# Patient Record
Sex: FEMALE | Race: WHITE | Employment: FULL TIME | URBAN - METROPOLITAN AREA
[De-identification: names, ages, dates, MRNs, and addresses within clinical notes are randomized per-mention and may not be internally consistent; named-entity substitution may affect disease eponyms.]

---

## 2022-02-26 ENCOUNTER — HOSPITAL ENCOUNTER (OUTPATIENT)
Age: 52
Setting detail: OBSERVATION
Discharge: HOME | End: 2022-02-27
Attending: EMERGENCY MEDICINE | Admitting: INTERNAL MEDICINE
Payer: COMMERCIAL

## 2022-02-26 ENCOUNTER — APPOINTMENT (OUTPATIENT)
Dept: CT IMAGING | Age: 52
End: 2022-02-26
Attending: EMERGENCY MEDICINE
Payer: COMMERCIAL

## 2022-02-26 DIAGNOSIS — G44.53 THUNDERCLAP HEADACHE: Primary | ICD-10-CM

## 2022-02-26 DIAGNOSIS — R20.0 RIGHT FACIAL NUMBNESS: ICD-10-CM

## 2022-02-26 DIAGNOSIS — R73.9 HYPERGLYCEMIA: ICD-10-CM

## 2022-02-26 DIAGNOSIS — G43.109 COMPLICATED MIGRAINE: ICD-10-CM

## 2022-02-26 PROBLEM — I63.9 ACUTE CVA (CEREBROVASCULAR ACCIDENT) (HCC): Status: ACTIVE | Noted: 2022-02-26

## 2022-02-26 LAB
ALBUMIN SERPL-MCNC: 4.8 G/DL (ref 3.5–5)
ALBUMIN/GLOB SERPL: 1.5 {RATIO} (ref 1.1–2.2)
ALP SERPL-CCNC: 48 U/L (ref 45–117)
ALT SERPL-CCNC: 53 U/L (ref 12–78)
ANION GAP SERPL CALC-SCNC: 20 MMOL/L (ref 5–15)
AST SERPL-CCNC: 21 U/L (ref 15–37)
ATRIAL RATE: 66 BPM
BASOPHILS # BLD: 0.1 K/UL (ref 0–0.1)
BASOPHILS NFR BLD: 1 % (ref 0–1)
BILIRUB SERPL-MCNC: 0.8 MG/DL (ref 0.2–1)
BUN SERPL-MCNC: 14 MG/DL (ref 6–20)
BUN/CREAT SERPL: 14 (ref 12–20)
CALCIUM SERPL-MCNC: 10 MG/DL (ref 8.5–10.1)
CALCULATED P AXIS, ECG09: 59 DEGREES
CALCULATED R AXIS, ECG10: 126 DEGREES
CALCULATED T AXIS, ECG11: 74 DEGREES
CHLORIDE SERPL-SCNC: 95 MMOL/L (ref 97–108)
CO2 SERPL-SCNC: 21 MMOL/L (ref 21–32)
CREAT SERPL-MCNC: 1 MG/DL (ref 0.55–1.02)
DIAGNOSIS, 93000: NORMAL
DIFFERENTIAL METHOD BLD: NORMAL
EOSINOPHIL # BLD: 0.1 K/UL (ref 0–0.4)
EOSINOPHIL NFR BLD: 3 % (ref 0–7)
ERYTHROCYTE [DISTWIDTH] IN BLOOD BY AUTOMATED COUNT: 12.5 % (ref 11.5–14.5)
GLOBULIN SER CALC-MCNC: 3.2 G/DL (ref 2–4)
GLUCOSE BLD STRIP.AUTO-MCNC: 193 MG/DL (ref 65–117)
GLUCOSE BLD STRIP.AUTO-MCNC: 310 MG/DL (ref 65–117)
GLUCOSE SERPL-MCNC: 306 MG/DL (ref 65–100)
HCT VFR BLD AUTO: 44.3 % (ref 35–47)
HGB BLD-MCNC: 14.8 G/DL (ref 11.5–16)
IMM GRANULOCYTES # BLD AUTO: 0 K/UL (ref 0–0.04)
IMM GRANULOCYTES NFR BLD AUTO: 0 % (ref 0–0.5)
INR PPP: 1.1 (ref 0.9–1.1)
LYMPHOCYTES # BLD: 2.2 K/UL (ref 0.8–3.5)
LYMPHOCYTES NFR BLD: 39 % (ref 12–49)
MCH RBC QN AUTO: 30.6 PG (ref 26–34)
MCHC RBC AUTO-ENTMCNC: 33.4 G/DL (ref 30–36.5)
MCV RBC AUTO: 91.7 FL (ref 80–99)
MONOCYTES # BLD: 0.3 K/UL (ref 0–1)
MONOCYTES NFR BLD: 6 % (ref 5–13)
NEUTS SEG # BLD: 2.8 K/UL (ref 1.8–8)
NEUTS SEG NFR BLD: 51 % (ref 32–75)
NRBC # BLD: 0 K/UL (ref 0–0.01)
NRBC BLD-RTO: 0 PER 100 WBC
P-R INTERVAL, ECG05: 154 MS
PLATELET # BLD AUTO: 322 K/UL (ref 150–400)
PMV BLD AUTO: 10.5 FL (ref 8.9–12.9)
POTASSIUM SERPL-SCNC: 3.7 MMOL/L (ref 3.5–5.1)
PROT SERPL-MCNC: 8 G/DL (ref 6.4–8.2)
PROTHROMBIN TIME: 10.9 SEC (ref 9–11.1)
Q-T INTERVAL, ECG07: 432 MS
QRS DURATION, ECG06: 90 MS
QTC CALCULATION (BEZET), ECG08: 452 MS
RBC # BLD AUTO: 4.83 M/UL (ref 3.8–5.2)
SERVICE CMNT-IMP: ABNORMAL
SERVICE CMNT-IMP: ABNORMAL
SODIUM SERPL-SCNC: 136 MMOL/L (ref 136–145)
VENTRICULAR RATE, ECG03: 66 BPM
WBC # BLD AUTO: 5.5 K/UL (ref 3.6–11)

## 2022-02-26 PROCEDURE — 96372 THER/PROPH/DIAG INJ SC/IM: CPT

## 2022-02-26 PROCEDURE — G0378 HOSPITAL OBSERVATION PER HR: HCPCS

## 2022-02-26 PROCEDURE — 65660000000 HC RM CCU STEPDOWN

## 2022-02-26 PROCEDURE — 70450 CT HEAD/BRAIN W/O DYE: CPT

## 2022-02-26 PROCEDURE — 36415 COLL VENOUS BLD VENIPUNCTURE: CPT

## 2022-02-26 PROCEDURE — 96374 THER/PROPH/DIAG INJ IV PUSH: CPT

## 2022-02-26 PROCEDURE — 99285 EMERGENCY DEPT VISIT HI MDM: CPT

## 2022-02-26 PROCEDURE — 82962 GLUCOSE BLOOD TEST: CPT

## 2022-02-26 PROCEDURE — 96361 HYDRATE IV INFUSION ADD-ON: CPT

## 2022-02-26 PROCEDURE — 96375 TX/PRO/DX INJ NEW DRUG ADDON: CPT

## 2022-02-26 PROCEDURE — 96376 TX/PRO/DX INJ SAME DRUG ADON: CPT

## 2022-02-26 PROCEDURE — 80053 COMPREHEN METABOLIC PANEL: CPT

## 2022-02-26 PROCEDURE — 74011000636 HC RX REV CODE- 636: Performed by: EMERGENCY MEDICINE

## 2022-02-26 PROCEDURE — 85610 PROTHROMBIN TIME: CPT

## 2022-02-26 PROCEDURE — 74011000250 HC RX REV CODE- 250: Performed by: EMERGENCY MEDICINE

## 2022-02-26 PROCEDURE — 70496 CT ANGIOGRAPHY HEAD: CPT

## 2022-02-26 PROCEDURE — 80307 DRUG TEST PRSMV CHEM ANLYZR: CPT

## 2022-02-26 PROCEDURE — 85025 COMPLETE CBC W/AUTO DIFF WBC: CPT

## 2022-02-26 PROCEDURE — 74011250636 HC RX REV CODE- 250/636: Performed by: EMERGENCY MEDICINE

## 2022-02-26 PROCEDURE — 74011250636 HC RX REV CODE- 250/636: Performed by: INTERNAL MEDICINE

## 2022-02-26 PROCEDURE — 93005 ELECTROCARDIOGRAM TRACING: CPT

## 2022-02-26 RX ORDER — AMIODARONE HYDROCHLORIDE 100 MG/1
200 TABLET ORAL DAILY
COMMUNITY

## 2022-02-26 RX ORDER — FENOFIBRATE 120 MG/1
TABLET ORAL
COMMUNITY

## 2022-02-26 RX ORDER — SODIUM CHLORIDE, SODIUM LACTATE, POTASSIUM CHLORIDE, CALCIUM CHLORIDE 600; 310; 30; 20 MG/100ML; MG/100ML; MG/100ML; MG/100ML
75 INJECTION, SOLUTION INTRAVENOUS CONTINUOUS
Status: DISCONTINUED | OUTPATIENT
Start: 2022-02-26 | End: 2022-02-27

## 2022-02-26 RX ORDER — PROCHLORPERAZINE EDISYLATE 5 MG/ML
5 INJECTION INTRAMUSCULAR; INTRAVENOUS
Status: COMPLETED | OUTPATIENT
Start: 2022-02-26 | End: 2022-02-26

## 2022-02-26 RX ORDER — INSULIN LISPRO 100 [IU]/ML
INJECTION, SOLUTION INTRAVENOUS; SUBCUTANEOUS
Status: DISCONTINUED | OUTPATIENT
Start: 2022-02-26 | End: 2022-02-27

## 2022-02-26 RX ORDER — ACETAMINOPHEN 650 MG/1
650 SUPPOSITORY RECTAL
Status: DISCONTINUED | OUTPATIENT
Start: 2022-02-26 | End: 2022-02-26

## 2022-02-26 RX ORDER — RIZATRIPTAN BENZOATE 5 MG/1
5 TABLET ORAL AS NEEDED
Status: DISCONTINUED | OUTPATIENT
Start: 2022-02-26 | End: 2022-02-27

## 2022-02-26 RX ORDER — ONDANSETRON 2 MG/ML
4 INJECTION INTRAMUSCULAR; INTRAVENOUS
Status: COMPLETED | OUTPATIENT
Start: 2022-02-26 | End: 2022-02-26

## 2022-02-26 RX ORDER — HYDROXYZINE 50 MG/1
50 TABLET, FILM COATED ORAL
COMMUNITY

## 2022-02-26 RX ORDER — ATORVASTATIN CALCIUM 40 MG/1
40 TABLET, FILM COATED ORAL DAILY
Status: DISCONTINUED | OUTPATIENT
Start: 2022-02-27 | End: 2022-02-27 | Stop reason: HOSPADM

## 2022-02-26 RX ORDER — PROMETHAZINE HYDROCHLORIDE 25 MG/ML
12.5 INJECTION, SOLUTION INTRAMUSCULAR; INTRAVENOUS
Status: DISCONTINUED | OUTPATIENT
Start: 2022-02-26 | End: 2022-02-26 | Stop reason: CLARIF

## 2022-02-26 RX ORDER — DIPHENHYDRAMINE HYDROCHLORIDE 50 MG/ML
25 INJECTION, SOLUTION INTRAMUSCULAR; INTRAVENOUS
Status: COMPLETED | OUTPATIENT
Start: 2022-02-26 | End: 2022-02-26

## 2022-02-26 RX ORDER — CETIRIZINE HYDROCHLORIDE 10 MG/1
CAPSULE, LIQUID FILLED ORAL
COMMUNITY

## 2022-02-26 RX ORDER — GABAPENTIN 300 MG/1
300 CAPSULE ORAL
Status: DISCONTINUED | OUTPATIENT
Start: 2022-02-26 | End: 2022-02-27 | Stop reason: HOSPADM

## 2022-02-26 RX ORDER — HYDROMORPHONE HYDROCHLORIDE 1 MG/ML
0.5 INJECTION, SOLUTION INTRAMUSCULAR; INTRAVENOUS; SUBCUTANEOUS
Status: COMPLETED | OUTPATIENT
Start: 2022-02-26 | End: 2022-02-26

## 2022-02-26 RX ORDER — CYCLOBENZAPRINE HCL 10 MG
TABLET ORAL
COMMUNITY

## 2022-02-26 RX ORDER — HYDROMORPHONE HYDROCHLORIDE 1 MG/ML
1 INJECTION, SOLUTION INTRAMUSCULAR; INTRAVENOUS; SUBCUTANEOUS ONCE
Status: COMPLETED | OUTPATIENT
Start: 2022-02-26 | End: 2022-02-26

## 2022-02-26 RX ORDER — HYDROXYZINE 25 MG/1
50 TABLET, FILM COATED ORAL
Status: DISCONTINUED | OUTPATIENT
Start: 2022-02-26 | End: 2022-02-27 | Stop reason: HOSPADM

## 2022-02-26 RX ORDER — BUPROPION HYDROCHLORIDE 150 MG/1
150 TABLET, EXTENDED RELEASE ORAL DAILY
Status: DISCONTINUED | OUTPATIENT
Start: 2022-02-27 | End: 2022-02-27 | Stop reason: HOSPADM

## 2022-02-26 RX ORDER — AMIODARONE HYDROCHLORIDE 200 MG/1
200 TABLET ORAL DAILY
Status: DISCONTINUED | OUTPATIENT
Start: 2022-02-27 | End: 2022-02-27 | Stop reason: HOSPADM

## 2022-02-26 RX ORDER — ACETAMINOPHEN 325 MG/1
650 TABLET ORAL
Status: DISCONTINUED | OUTPATIENT
Start: 2022-02-26 | End: 2022-02-27 | Stop reason: HOSPADM

## 2022-02-26 RX ORDER — ACETAMINOPHEN 650 MG/1
650 SUPPOSITORY RECTAL
Status: DISCONTINUED | OUTPATIENT
Start: 2022-02-26 | End: 2022-02-27 | Stop reason: HOSPADM

## 2022-02-26 RX ORDER — FENOFIBRATE 145 MG/1
145 TABLET, COATED ORAL DAILY
Status: DISCONTINUED | OUTPATIENT
Start: 2022-02-27 | End: 2022-02-27 | Stop reason: HOSPADM

## 2022-02-26 RX ORDER — BISACODYL 5 MG
5 TABLET, DELAYED RELEASE (ENTERIC COATED) ORAL DAILY PRN
Status: DISCONTINUED | OUTPATIENT
Start: 2022-02-26 | End: 2022-02-27 | Stop reason: HOSPADM

## 2022-02-26 RX ORDER — CYCLOBENZAPRINE HCL 10 MG
10 TABLET ORAL
Status: DISCONTINUED | OUTPATIENT
Start: 2022-02-26 | End: 2022-02-27 | Stop reason: HOSPADM

## 2022-02-26 RX ORDER — HYDROMORPHONE HYDROCHLORIDE 1 MG/ML
1 INJECTION, SOLUTION INTRAMUSCULAR; INTRAVENOUS; SUBCUTANEOUS
Status: DISCONTINUED | OUTPATIENT
Start: 2022-02-26 | End: 2022-02-27

## 2022-02-26 RX ORDER — GABAPENTIN 300 MG/1
300 CAPSULE ORAL
COMMUNITY

## 2022-02-26 RX ORDER — PROMETHAZINE HYDROCHLORIDE 25 MG/ML
12.5 INJECTION, SOLUTION INTRAMUSCULAR; INTRAVENOUS
Status: DISCONTINUED | OUTPATIENT
Start: 2022-02-26 | End: 2022-02-26

## 2022-02-26 RX ORDER — MAGNESIUM SULFATE 100 %
4 CRYSTALS MISCELLANEOUS AS NEEDED
Status: DISCONTINUED | OUTPATIENT
Start: 2022-02-26 | End: 2022-02-27 | Stop reason: SDUPTHER

## 2022-02-26 RX ADMIN — SODIUM CHLORIDE 1000 ML: 9 INJECTION, SOLUTION INTRAVENOUS at 13:03

## 2022-02-26 RX ADMIN — HYDROMORPHONE HYDROCHLORIDE 0.5 MG: 1 INJECTION, SOLUTION INTRAMUSCULAR; INTRAVENOUS; SUBCUTANEOUS at 18:23

## 2022-02-26 RX ADMIN — HYDROMORPHONE HYDROCHLORIDE 1 MG: 1 INJECTION, SOLUTION INTRAMUSCULAR; INTRAVENOUS; SUBCUTANEOUS at 21:07

## 2022-02-26 RX ADMIN — PROCHLORPERAZINE EDISYLATE 5 MG: 5 INJECTION, SOLUTION INTRAMUSCULAR; INTRAVENOUS at 21:06

## 2022-02-26 RX ADMIN — ONDANSETRON HYDROCHLORIDE 4 MG: 2 SOLUTION INTRAMUSCULAR; INTRAVENOUS at 18:23

## 2022-02-26 RX ADMIN — DIPHENHYDRAMINE HYDROCHLORIDE 25 MG: 50 INJECTION INTRAMUSCULAR; INTRAVENOUS at 12:29

## 2022-02-26 RX ADMIN — HYDROMORPHONE HYDROCHLORIDE 0.5 MG: 1 INJECTION, SOLUTION INTRAMUSCULAR; INTRAVENOUS; SUBCUTANEOUS at 15:35

## 2022-02-26 RX ADMIN — ONDANSETRON HYDROCHLORIDE 4 MG: 2 SOLUTION INTRAMUSCULAR; INTRAVENOUS at 15:11

## 2022-02-26 RX ADMIN — IOPAMIDOL 100 ML: 755 INJECTION, SOLUTION INTRAVENOUS at 12:53

## 2022-02-26 RX ADMIN — HYDROMORPHONE HYDROCHLORIDE 1 MG: 1 INJECTION, SOLUTION INTRAMUSCULAR; INTRAVENOUS; SUBCUTANEOUS at 13:42

## 2022-02-26 RX ADMIN — SODIUM CHLORIDE, POTASSIUM CHLORIDE, SODIUM LACTATE AND CALCIUM CHLORIDE 75 ML/HR: 600; 310; 30; 20 INJECTION, SOLUTION INTRAVENOUS at 22:07

## 2022-02-26 RX ADMIN — PROCHLORPERAZINE EDISYLATE 10 MG: 5 INJECTION, SOLUTION INTRAMUSCULAR; INTRAVENOUS at 12:29

## 2022-02-26 NOTE — ED TRIAGE NOTES
Triage: pt was using restroom when she developed an acute onset of a headache at 1045. Pt reports N/V, generalized weakness and tingling after onset of headache. Denies chest pain, fever, blurred vision.

## 2022-02-26 NOTE — ED PROVIDER NOTES
71-year-old female with a history of atrial fibrillation, on Eliquis, DM, HLD, CATARINO, and migraine headaches, presents to the emergency department stating that she was using restroom about 10:45 AM this morning when she developed an acute severe thunderclap type headache. She notes associated nausea and vomiting and notes decreased sensation to the right side of her face as well as generalized weakness. She denies any chest pain, palpitations, S OB, fever, chills, neck pain, blurred vision, difficulty speaking or walking. She states that she feels different than her prior migraine type headaches. She denies any falls or head trauma. Denies any history of prior intracranial hemorrhage. She took 2 doses of her previously Rx'd Maxalt after the onset of her symptoms to no avail of her symptoms. On arrival she complains of 10/10 headache. Code S was called on arrival with concern for possible intracranial hemorrhage versus complicated migraine, versus CVA. Past Medical History:   Diagnosis Date    [facility]-fib ([facility] [address]     Diabetes ([facility] [address]     High cholesterol     Obstructive sleep apnea        Past Surgical History:   Procedure Laterality Date    HX AFIB ABLATION      HX  SECTION      HX GYN      HX OTHER SURGICAL      rectal spinchter sx    HX PARTIAL HYSTERECTOMY      HX TONSILLECTOMY      OR BREAST SURGERY PROCEDURE UNLISTED      R breast biopsy         History reviewed. No pertinent family history. Social History     Socioeconomic History    Marital status: Not on file     Spouse name: Not on file    Number of children: Not on file    Years of education: Not on file    Highest education level: Not on file   Occupational History    Not on file   Tobacco Use    Smoking status: Former Smoker    Smokeless tobacco: Never Used   Substance and Sexual Activity    Alcohol use:  Yes    Drug use: Never    Sexual activity: Not on file   Other Topics Concern    Not on file   Social History Narrative    Not on file     Social Determinants of Health     Financial Resource Strain:     Difficulty of Paying Living Expenses: Not on file   Food Insecurity:     Worried About Running Out of Food in the Last Year: Not on file    Phoebe of Food in the Last Year: Not on file   Transportation Needs:     Lack of Transportation (Medical): Not on file    Lack of Transportation (Non-Medical): Not on file   Physical Activity:     Days of Exercise per Week: Not on file    Minutes of Exercise per Session: Not on file   Stress:     Feeling of Stress : Not on file   Social Connections:     Frequency of Communication with Friends and Family: Not on file    Frequency of Social Gatherings with Friends and Family: Not on file    Attends Voodoo Services: Not on file    Active Member of [address] [place] [address] [employer] or Organizations: Not on file    Attends Club or Organization Meetings: Not on file    Marital Status: Not on file   Intimate Partner Violence:     Fear of Current or Ex-Partner: Not on file    Emotionally Abused: Not on file    Physically Abused: Not on file    Sexually Abused: Not on file   Housing Stability:     Unable to Pay for Housing in the Last Year: Not on file    Number of [place] in the Last Year: Not on file    Unstable Housing in the Last Year: Not on file         ALLERGIES: Other medication and Tape [adhesive]    Review of Systems   Constitutional: Positive for activity change and appetite change. Negative for chills and fever. HENT: Negative for congestion, rhinorrhea, sinus pressure, sneezing and sore throat. Eyes: Positive for photophobia. Negative for visual disturbance. Respiratory: Negative for cough and shortness of breath. Cardiovascular: Negative for chest pain. Gastrointestinal: Positive for nausea and vomiting. Negative for abdominal pain, blood in stool, constipation and diarrhea.    Genitourinary: Negative for difficulty urinating, dysuria, flank pain, frequency, hematuria, menstrual problem, urgency, vaginal bleeding and vaginal discharge. Musculoskeletal: Negative for arthralgias, back pain, myalgias and neck pain. Skin: Negative for rash and wound. Neurological: Positive for numbness and headaches. Negative for dizziness, syncope, facial asymmetry, speech difficulty, weakness and light-headedness. Psychiatric/Behavioral: Negative for self-injury and suicidal ideas. All other systems reviewed and are negative. Vitals:    02/26/22 1221   BP: (!) 140/66   Pulse: 67   Resp: 20   Temp: 98 °F (36.7 °C)   SpO2: 100%            Physical Exam  Vitals and nursing note reviewed. Constitutional:       General: She is in acute distress (significantly uncomfortable appearing due to pain). Appearance: Normal appearance. She is well-developed. She is not diaphoretic. HENT:      Head: Normocephalic and atraumatic. Nose: Nose normal.   Eyes:      Extraocular Movements: Extraocular movements intact. Conjunctiva/sclera: Conjunctivae normal.      Pupils: Pupils are equal, round, and reactive to light. Cardiovascular:      Rate and Rhythm: Normal rate and regular rhythm. Heart sounds: Normal heart sounds. Pulmonary:      Effort: Pulmonary effort is normal.      Breath sounds: Normal breath sounds. Abdominal:      General: There is no distension. Palpations: Abdomen is soft. Tenderness: There is no abdominal tenderness. Musculoskeletal:         General: No tenderness. Cervical back: Normal range of motion and neck supple. Skin:     General: Skin is warm and dry. Neurological:      General: No focal deficit present. Mental Status: She is alert and oriented to person, place, and time. Cranial Nerves: No cranial nerve deficit. Sensory: Sensory deficit (decreased sensation to right face but otherwise intact sensation) present. Motor: No weakness.       Coordination: Coordination normal.      Comments: 5/5 strength in all 4 extremities, intact finger to nose, neg pronator drift, fluent speech, CN intact. MDM   50-year-old female presents with acute thunderclap headache. Concern for [facility] or similar ICH versus complex migraine versus CVA. Case was discussed with teleneurology who saw the patient at the bedside. Not TPA candidate due to Eliquis. Labs returned showing hyperglycemia with glucose 310, but normal bicarb. CT head shows no acute intracranial abnormalities, CTA head neck shows no flow-limiting stenosis or LVO. Discussed again with teleneurology who recommends admission for MRI and further evaluation. States that LP would need to be delayed for at least 48 hours given Eliquis dosing. She was given pain and nausea medications in the ED and had some improvement in her symptoms. We will admit to the hospitalist service for further care and assessment. Procedures    1305 EKG shows normal sinus rhythm with a rate of 66 bpm with occasional PACs and some nonspecific T wave flattening inferolaterally but no acute ST [id number] abnormalities suggestive of ischemia. Perfect Serve Consult for Admission  2:00 PM    ED Room Number: SER06/06  Patient Name and age:  Georgia 45 y.o.  female  Working Diagnosis:   1. Thunderclap headache    2. Complicated migraine    3. Right facial numbness    4. Hyperglycemia        COVID-19 Suspicion:  no  Sepsis present:  no  Reassessment needed: no  Code Status:  Full Code  Readmission: no  Isolation Requirements:  no  Recommended Level of Care:  telemetry  Department:[facility] - [phone]  Other: 50-year-old female with a history of migraines presented with acute thunderclap type headache using the bathroom at 10:45 AM.  Right-sided facial tingling but otherwise no focal neuro deficit. Seen by teleneurology and recommended admission for MRI, MRV.   On Eliquis so concern for possible small SAH versus symptomatically, but would need to wait 48 hours before LP.

## 2022-02-27 ENCOUNTER — APPOINTMENT (OUTPATIENT)
Dept: MRI IMAGING | Age: 52
End: 2022-02-27
Attending: INTERNAL MEDICINE
Payer: COMMERCIAL

## 2022-02-27 VITALS
HEIGHT: 68 IN | BODY MASS INDEX: 30.2 KG/M2 | TEMPERATURE: 98.5 F | OXYGEN SATURATION: 95 % | SYSTOLIC BLOOD PRESSURE: 101 MMHG | RESPIRATION RATE: 20 BRPM | HEART RATE: 86 BPM | DIASTOLIC BLOOD PRESSURE: 55 MMHG | WEIGHT: 199.3 LBS

## 2022-02-27 LAB
AMPHET UR QL SCN: POSITIVE
BARBITURATES UR QL SCN: NEGATIVE
BENZODIAZ UR QL: NEGATIVE
CANNABINOIDS UR QL SCN: NEGATIVE
CHOLEST SERPL-MCNC: 218 MG/DL
COCAINE UR QL SCN: NEGATIVE
COMMENT, HOLDF: NORMAL
CRP SERPL-MCNC: 0.65 MG/DL (ref 0–0.6)
DRUG SCRN COMMENT,DRGCM: ABNORMAL
EST. AVERAGE GLUCOSE BLD GHB EST-MCNC: 255 MG/DL
FOLATE SERPL-MCNC: 27.2 NG/ML (ref 5–21)
GLUCOSE BLD STRIP.AUTO-MCNC: 225 MG/DL (ref 65–117)
GLUCOSE BLD STRIP.AUTO-MCNC: 270 MG/DL (ref 65–117)
GLUCOSE BLD STRIP.AUTO-MCNC: 280 MG/DL (ref 65–117)
HBA1C MFR BLD: 10.5 % (ref 4–5.6)
HDLC SERPL-MCNC: 29 MG/DL
HDLC SERPL: 7.5 {RATIO} (ref 0–5)
LDLC SERPL CALC-MCNC: 115.4 MG/DL (ref 0–100)
MAGNESIUM SERPL-MCNC: 1.9 MG/DL (ref 1.6–2.4)
METHADONE UR QL: NEGATIVE
OPIATES UR QL: POSITIVE
PCP UR QL: NEGATIVE
PHOSPHATE SERPL-MCNC: 4.4 MG/DL (ref 2.6–4.7)
SAMPLES BEING HELD,HOLD: NORMAL
SERVICE CMNT-IMP: ABNORMAL
TRIGL SERPL-MCNC: 368 MG/DL (ref ?–150)
TROPONIN-HIGH SENSITIVITY: 45 NG/L (ref 0–51)
TROPONIN-HIGH SENSITIVITY: 51 NG/L (ref 0–51)
TSH SERPL DL<=0.05 MIU/L-ACNC: 2.26 UIU/ML (ref 0.36–3.74)
VIT B12 SERPL-MCNC: 480 PG/ML (ref 193–986)
VLDLC SERPL CALC-MCNC: 73.6 MG/DL

## 2022-02-27 PROCEDURE — 82607 VITAMIN B-12: CPT

## 2022-02-27 PROCEDURE — 74011636637 HC RX REV CODE- 636/637: Performed by: HOSPITALIST

## 2022-02-27 PROCEDURE — 84484 ASSAY OF TROPONIN QUANT: CPT

## 2022-02-27 PROCEDURE — 80061 LIPID PANEL: CPT

## 2022-02-27 PROCEDURE — 82746 ASSAY OF FOLIC ACID SERUM: CPT

## 2022-02-27 PROCEDURE — 96375 TX/PRO/DX INJ NEW DRUG ADDON: CPT

## 2022-02-27 PROCEDURE — 83036 HEMOGLOBIN GLYCOSYLATED A1C: CPT

## 2022-02-27 PROCEDURE — 83735 ASSAY OF MAGNESIUM: CPT

## 2022-02-27 PROCEDURE — 84443 ASSAY THYROID STIM HORMONE: CPT

## 2022-02-27 PROCEDURE — 94660 CPAP INITIATION&MGMT: CPT

## 2022-02-27 PROCEDURE — G0378 HOSPITAL OBSERVATION PER HR: HCPCS

## 2022-02-27 PROCEDURE — 74011000250 HC RX REV CODE- 250: Performed by: HOSPITALIST

## 2022-02-27 PROCEDURE — 82962 GLUCOSE BLOOD TEST: CPT

## 2022-02-27 PROCEDURE — 70551 MRI BRAIN STEM W/O DYE: CPT

## 2022-02-27 PROCEDURE — 36415 COLL VENOUS BLD VENIPUNCTURE: CPT

## 2022-02-27 PROCEDURE — 74011250637 HC RX REV CODE- 250/637: Performed by: INTERNAL MEDICINE

## 2022-02-27 PROCEDURE — 94760 N-INVAS EAR/PLS OXIMETRY 1: CPT

## 2022-02-27 PROCEDURE — 96361 HYDRATE IV INFUSION ADD-ON: CPT

## 2022-02-27 PROCEDURE — 96366 THER/PROPH/DIAG IV INF ADDON: CPT

## 2022-02-27 PROCEDURE — 96376 TX/PRO/DX INJ SAME DRUG ADON: CPT

## 2022-02-27 PROCEDURE — 74011250636 HC RX REV CODE- 250/636: Performed by: HOSPITALIST

## 2022-02-27 PROCEDURE — 99223 1ST HOSP IP/OBS HIGH 75: CPT | Performed by: PSYCHIATRY & NEUROLOGY

## 2022-02-27 PROCEDURE — 86140 C-REACTIVE PROTEIN: CPT

## 2022-02-27 PROCEDURE — 96365 THER/PROPH/DIAG IV INF INIT: CPT

## 2022-02-27 PROCEDURE — 84100 ASSAY OF PHOSPHORUS: CPT

## 2022-02-27 RX ORDER — MAGNESIUM SULFATE 100 %
4 CRYSTALS MISCELLANEOUS AS NEEDED
Status: DISCONTINUED | OUTPATIENT
Start: 2022-02-27 | End: 2022-02-27 | Stop reason: HOSPADM

## 2022-02-27 RX ORDER — INSULIN GLARGINE 100 [IU]/ML
20 INJECTION, SOLUTION SUBCUTANEOUS DAILY
Status: DISCONTINUED | OUTPATIENT
Start: 2022-02-27 | End: 2022-02-27 | Stop reason: HOSPADM

## 2022-02-27 RX ORDER — INSULIN LISPRO 100 [IU]/ML
INJECTION, SOLUTION INTRAVENOUS; SUBCUTANEOUS
Status: DISCONTINUED | OUTPATIENT
Start: 2022-02-27 | End: 2022-02-27 | Stop reason: HOSPADM

## 2022-02-27 RX ORDER — INSULIN GLARGINE 100 [IU]/ML
35 INJECTION, SOLUTION SUBCUTANEOUS DAILY
COMMUNITY

## 2022-02-27 RX ORDER — MAGNESIUM SULFATE HEPTAHYDRATE 40 MG/ML
2 INJECTION, SOLUTION INTRAVENOUS ONCE
Status: COMPLETED | OUTPATIENT
Start: 2022-02-27 | End: 2022-02-27

## 2022-02-27 RX ORDER — METFORMIN HYDROCHLORIDE 1000 MG/1
1000 TABLET ORAL 2 TIMES DAILY WITH MEALS
COMMUNITY

## 2022-02-27 RX ORDER — DEXAMETHASONE SODIUM PHOSPHATE 10 MG/ML
10 INJECTION INTRAMUSCULAR; INTRAVENOUS ONCE
Status: COMPLETED | OUTPATIENT
Start: 2022-02-27 | End: 2022-02-27

## 2022-02-27 RX ORDER — KETOROLAC TROMETHAMINE 30 MG/ML
30 INJECTION, SOLUTION INTRAMUSCULAR; INTRAVENOUS
Status: COMPLETED | OUTPATIENT
Start: 2022-02-27 | End: 2022-02-27

## 2022-02-27 RX ORDER — INSULIN LISPRO 100 [IU]/ML
INJECTION, SOLUTION INTRAVENOUS; SUBCUTANEOUS
COMMUNITY

## 2022-02-27 RX ADMIN — BUPROPION HYDROCHLORIDE 150 MG: 150 TABLET, EXTENDED RELEASE ORAL at 08:46

## 2022-02-27 RX ADMIN — SODIUM CHLORIDE 500 ML: 9 INJECTION, SOLUTION INTRAVENOUS at 13:35

## 2022-02-27 RX ADMIN — CYCLOBENZAPRINE 10 MG: 10 TABLET, FILM COATED ORAL at 00:48

## 2022-02-27 RX ADMIN — GABAPENTIN 300 MG: 300 CAPSULE ORAL at 00:48

## 2022-02-27 RX ADMIN — APIXABAN 5 MG: 5 TABLET, FILM COATED ORAL at 08:46

## 2022-02-27 RX ADMIN — DEXAMETHASONE SODIUM PHOSPHATE 10 MG: 10 INJECTION INTRAMUSCULAR; INTRAVENOUS at 14:26

## 2022-02-27 RX ADMIN — PROCHLORPERAZINE EDISYLATE 10 MG: 5 INJECTION, SOLUTION INTRAMUSCULAR; INTRAVENOUS at 14:40

## 2022-02-27 RX ADMIN — HYDROXYZINE HYDROCHLORIDE 50 MG: 25 TABLET, FILM COATED ORAL at 00:49

## 2022-02-27 RX ADMIN — BISACODYL 5 MG: 5 TABLET, COATED ORAL at 08:46

## 2022-02-27 RX ADMIN — Medication 5 UNITS: at 11:54

## 2022-02-27 RX ADMIN — Medication 3 UNITS: at 08:43

## 2022-02-27 RX ADMIN — ATORVASTATIN CALCIUM 40 MG: 40 TABLET, FILM COATED ORAL at 08:46

## 2022-02-27 RX ADMIN — ACETAMINOPHEN 650 MG: 325 TABLET ORAL at 00:49

## 2022-02-27 RX ADMIN — GABAPENTIN 300 MG: 300 CAPSULE ORAL at 11:09

## 2022-02-27 RX ADMIN — KETOROLAC TROMETHAMINE 30 MG: 30 INJECTION, SOLUTION INTRAMUSCULAR; INTRAVENOUS at 13:42

## 2022-02-27 RX ADMIN — AMIODARONE HYDROCHLORIDE 200 MG: 200 TABLET ORAL at 08:46

## 2022-02-27 RX ADMIN — Medication 5 UNITS: at 16:53

## 2022-02-27 RX ADMIN — FENOFIBRATE 145 MG: 145 TABLET ORAL at 08:46

## 2022-02-27 RX ADMIN — INSULIN GLARGINE 20 UNITS: 100 INJECTION, SOLUTION SUBCUTANEOUS at 08:43

## 2022-02-27 RX ADMIN — MAGNESIUM SULFATE HEPTAHYDRATE 2 G: 2 INJECTION, SOLUTION INTRAVENOUS at 14:44

## 2022-02-27 NOTE — CONSULTS
NEUROLOGY   INPATIENT EVALUATION/CONSULTATION       PATIENT NAME: Wil Fuller    MRN: [id number]    REASON FOR CONSULTATION: Thunderclap headache, right facial numbness    22      HISTORY OF PRESENT ILLNESS:  Wil Fuller is a 45 y.o. right-hand-dominant female history of bipolar disorder as well as self-described cluster headaches and atrial fibrillation status post recent ablation earlier this week [place] [facility] [place]ap headache and right facial numbness. Patient endorses getting migraine after her ablation but otherwise doing fine throughout the week. Yesterday she was in a nicolas potty at [facility] where she had sudden onset of severe headache along the right side of her head. This was associated with severe nausea and right-sided sensory disturbance. She went to [facility] ER where CT CTA were unrevealing with patient then sent to [facility] for further evaluation. Overnight headache dissipated with only residual mild left throbbing pain right now. Denies any headache like this in the past.  Typically gets much describes as cluster headaches over her right eye with tearing and conjunctival injection. Denies any illnesses. Denies any focal neurologic deficits at this time.   Is on Eliquis for atrial fibrillation    PAST MEDICAL HISTORY:  Past Medical History:   Diagnosis Date    A-fib ([facility] [address]     Bipolar 1 disorder ([facility] [address]     Diabetes ([facility] [address]     High cholesterol     Obstructive sleep apnea        PAST SURGICAL HISTORY:  Past Surgical History:   Procedure Laterality Date    HX AFIB ABLATION      HX  SECTION      HX GYN      HX OTHER SURGICAL      rectal spinchter sx    HX PARTIAL HYSTERECTOMY      HX TONSILLECTOMY      WA BREAST SURGERY PROCEDURE UNLISTED      R breast biopsy       FAMILY HISTORY:   Appears noncontributory      SOCIAL HISTORY:  Social History     Socioeconomic History    Marital status:    Tobacco Use    Smoking status: Former Smoker    Smokeless tobacco: Never Used   Substance and Sexual Activity    Alcohol use:  Yes    Drug use: Never         MEDICATIONS:   Current Facility-Administered Medications   Medication Dose Route Frequency Provider Last Rate Last Admin    glucose chewable tablet 16 g  4 Tablet Oral PRN Mariel Fernandez MD        dextrose 10 % infusion 0-250 mL  0-250 mL IntraVENous PRN Mariel Fernandez MD        glucagon [facility]) injection 1 mg  1 mg IntraMUSCular PRN Mariel Fernandez MD        insulin glargine (LANTUS) injection 20 Units  20 Units SubCUTAneous DAILY Mariel Fernandez MD   20 Units at 02/27/22 5497    insulin lispro (HUMALOG) injection   SubCUTAneous AC&HS Mariel Fernandez MD   3 Units at 02/27/22 8378    amiodarone (CORDARONE) tablet 200 mg  200 mg Oral DAILY Madhu Moreno MD   200 mg at 02/27/22 0846    apixaban (ELIQUIS) tablet 5 mg  5 mg Oral BID Madhu Moreno MD   5 mg at 02/27/22 0846    atorvastatin (LIPITOR) tablet 40 mg  40 mg Oral DAILY Madhu Moreno MD   40 mg at 02/27/22 0846    buPROPion SR (WELLBUTRIN SR) tablet 150 mg  150 mg Oral DAILY Madhu Moreno MD   150 mg at 02/27/22 0846    cyclobenzaprine (FLEXERIL) tablet 10 mg  10 mg Oral TID PRN Ramón NOBLE MD   10 mg at 02/27/22 0048    fenofibrate nanocrystallized (TRICOR) tablet 145 mg  145 mg Oral DAILY Madhu Moreno MD   145 mg at 02/27/22 0846    gabapentin (NEURONTIN) capsule 300 mg  300 mg Oral DAILY PRN Madhu Moreno MD   300 mg at 02/27/22 1109    hydrOXYzine HCL (ATARAX) tablet 50 mg  50 mg Oral TID PRN Madhu Moreno MD   50 mg at 02/27/22 0049    acetaminophen (TYLENOL) tablet 650 mg  650 mg Oral Q4H PRN Madhu Moreno MD   650 mg at 02/27/22 0049    Or    acetaminophen (TYLENOL) solution 650 mg  650 mg Per NG tube Q4H PRN Madhu Moreno MD        Or    acetaminophen (TYLENOL) suppository 650 mg  650 mg Rectal Q4H PRN Madhu Moreno MD        bisacodyL (DULCOLAX) tablet 5 mg  5 mg Oral DAILY PRN Dilip NOBLE MD   5 mg at 02/27/22 0846    dextrose 10 % infusion 0-250 mL  0-250 mL IntraVENous PRN Madhu Moreno MD        lactated Ringers infusion  75 mL/hr IntraVENous CONTINUOUS Madhu Moreno MD 75 mL/hr at 02/26/22 2207 75 mL/hr at 02/26/22 2207    prochlorperazine (COMPAZINE) with saline injection 5 mg  5 mg IntraVENous Q6H PRN Madhu Moreno MD             ALLERGIES:  Allergies   Allergen Reactions    Other Medication Anaphylaxis     MRI Contrast Dye    Tape [Adhesive] Itching         REVIEW OF SYSTEMS:  10 point ROS reviewed with patient and negative except for those listed above. PHYSICAL EXAM:  Vital Signs:   Visit Vitals  BP (!) 102/58 (BP 1 Location: Right upper arm, BP Patient Position: At rest)   Pulse 98   Temp 98.5 °F (36.9 °C)   Resp 22   Ht 5' 8\" (1.727 m)   Wt 199 lb 4.7 oz (90.4 kg)   SpO2 95%   BMI 30.30 kg/m²     Pleasant female scannable in exam room in no distress. HEENT appears grossly unremarkable neck appears supple. Cardiopulmonary exams appear grossly unremarkable. Abdomen is nondistended. Extremities are warm/dry. Neurologically, patient appears alert and oriented attention appears intact. Speech is clear, language fluent. Cranial nerves II through XII appear grossly unremarkable. Motorically patient has normal bulk and tone 5/5 strength in upper and lower extremities. Coordination is intact upper extremities. Sensation appears grossly intact. Remainder examination is deferred    PERTINENT DATA:  HbA1c 10.5%  Chol 218,     CT Results (maximum last 3): MRI Results (maximum last 3): Results from [place] encounter on 02/26/22    MRI BRAIN WO CONT    Narrative  EXAM: MRI BRAIN WO CONT    INDICATION: Acute CVA    COMPARISON: CT scans 2/26/2022. CONTRAST: None. TECHNIQUE:  Multiplanar multisequence acquisition without contrast of the brain. FINDINGS:  The ventricles are normal in size and position.  There is no acute infarct,  hemorrhage, extra-axial fluid collection, or mass effect. There is no cerebellar  tonsillar herniation. Expected arterial flow-voids are present. Several tiny  nonspecific foci of white matter signal alteration is shown in the cerebrum  bilaterally. The paranasal sinuses, mastoid air cells, and middle ears are clear. The orbital  contents are within normal limits. No significant osseous or scalp lesions are  identified. Impression  No evidence for acute infarction. ASSESSMENT:      Chai Napoles is a 25-year-old right-hand-dominant female with history of self-described cluster headaches, bipolar disorder presented to [address] transfer from [facility] ER for thunderclap headache    RECOMMENDATIONS:  Thunderclap headache, right facial paresthesias:   Differential for thunderclap headache is reasonably long, discussed with patient that migraine headache is a potential cause  Patient's exam is nonconcerning right now MRI was obtained after evaluation which is unremarkable.   No vascular dissection, no evidence for RCVS noted on CTA  Doubt CVST based on examination, patient being on anticoagulation  Patient describes residual throbbing headache supportive of migraine  Essential to medication right now with headache continues to build would attempt a one-time cocktail of 30 mg of IV ketorolac, 10 mg of Compazine, 10 mg of dexamethasone, 2000 mg of magnesium and a 500 cc fluid bolus  Patient endorses a migraine headache earlier this week during time of ablation otherwise does not get these frequently can be referred back to primary care with outpatient neurology for referral as necessary  Inpatient psychiatry has been requested for medication management for chronic mood disorder does not appear the appropriate venue for this but will defer to their recommendations  Appears clear for discharge from a neurology standpoint      Mik Hernandez MD

## 2022-02-27 NOTE — DISCHARGE SUMMARY
Discharge Summary       PATIENT ID: Alana Vail  MRN: [id number]   YOB: 1970    DATE OF ADMISSION: 2/26/2022 12:11 PM    DATE OF DISCHARGE: 2/27/2022    PRIMARY CARE PROVIDER: Blair Fairchild MD     ATTENDING PHYSICIAN: Padmini Cortes MD   DISCHARGING PROVIDER: Aga Rodríguez MD    To contact this individual call [phone] and ask the  to page. If unavailable ask to be transferred the Adult Hospitalist Department. CONSULTATIONS: IP CONSULT TO NEUROLOGY  IP CONSULT TO PSYCHIATRY    PROCEDURES/SURGERIES: * No surgery found *    ADMISSION NOTE   Alana Vail is a 45 y.o. right-hand-dominant female history of bipolar disorder as well as self-described cluster headaches and atrial fibrillation status post recent ablation earlier this week who [place] [facility] [place]ap headache and right facial numbness. Patient endorses getting migraine after her ablation but otherwise doing fine throughout the week. Yesterday she was in a nicolas potty at a [facility] where she had sudden onset of severe headache along the right side of her head. This was associated with severe nausea and right-sided sensory disturbance. She went to [facility] ER where CT CTA were unrevealing with patient then sent to [facility] for further evaluation. Overnight headache dissipated with only residual mild left throbbing pain right now. Denies any headache like this in the past.  Typically gets much describes as cluster headaches over her right eye with tearing and conjunctival injection. Denies any illnesses. Denies any focal neurologic deficits at this time. Is on Eliquis for atrial fibrillation        [address] COURSE:   Thunderclap headache, right facial paresthesias:likely due to complex migraine. Rule out CVA   Neuro consulted,   MRI  Negative.   No vascular dissection, no evidence for RCVS noted on CTA  Doubt CVST based on examination, patient being on anticoagulation  wound attempt a one-time cocktail of 30 mg of IV ketorolac, 10 mg of Compazine, 10 mg of dexamethasone, 2000 mg of magnesium and a 500 cc fluid bolus per neurologist recommendation   Dc home after the cocktail tx for migraine     H/o afib s/p ablation:  Remain sinus  Continue amiodarone and eliquis     DM:  Resume home metformin and lantus, ssi. Pt is none complaint of her insulin  Instruction to follow up pcp     Dyslipidemia. continue with Lipitor   Obstructive sleep apnea.: CONTINUE CPAP. Bipolar disorder. resume home  medication. can follow with her psych for future tx. DISCHARGE PLAN:      1. Check blood sugar 4 times a day and follow up PCP   2. Follow up cardiologist   3. Follow up psychiatry          NOTIFY YOUR PHYSICIAN FOR ANY OF THE FOLLOWING:   Fever over 101 degrees for 24 hours. Chest pain, shortness of breath, fever, chills, nausea, vomiting, diarrhea, change in mentation, falling, weakness, bleeding. Severe pain or pain not relieved by medications, as well as any other signs or symptoms that you may have questions about. FOLLOW UP APPOINTMENTS:    Follow-up Information     Follow up With Specialties Details Why Contact Info    Ariela Weems MD Lab, Internal Medicine   [address] [place] [address]  [phone]               DIET: Diabetic Diet    ACTIVITY: Activity as tolerated        DISCHARGE MEDICATIONS:  Current Discharge Medication List      CONTINUE these medications which have NOT CHANGED    Details   cariprazine (VRAYLAR) 3 mg capsule Take 3 mg by mouth every evening. insulin glargine (Lantus U-100 Insulin) 100 unit/mL injection 35 Units by SubCUTAneous route daily. insulin lispro (HumaLOG U-100 Insulin) 100 unit/mL injection by SubCUTAneous route. apixaban (ELIQUIS) 2.5 mg tablet Take 5 mg by mouth two (2) times a day. amiodarone (PACERONE) 100 mg tablet Take 200 mg by mouth daily.       atorvastatin calcium (LIPITOR PO) Take  by mouth daily. Fenofibrate 120 mg tab Take  by mouth. Cetirizine (ZyrTEC) 10 mg cap Take  by mouth. OTHER Scar-Red      gabapentin (NEURONTIN) 300 mg capsule Take 300 mg by mouth daily as needed for Pain. cyclobenzaprine (FLEXERIL) 10 mg tablet Take  by mouth three (3) times daily as needed for Muscle Spasm(s). hydrOXYzine HCL (ATARAX) 50 mg tablet Take 50 mg by mouth three (3) times daily as needed. bupropion HCl (WELLBUTRIN PO) Take  by mouth.      rizatriptan benzoate (MAXALT PO) Take  by mouth. DISPOSITION:    Home With:   OT  PT  HH  RN       Long term SNF/Inpatient Rehab    Independent/assisted living    Hospice   x Other:       PATIENT CONDITION AT DISCHARGE:     Functional status    Poor     Deconditioned    x Independent      Cognition    x Lucid     Forgetful     Dementia      Catheters/lines (plus indication)    Calderón     PICC     PEG    x None      Code status   x  Full code     DNR      PHYSICAL EXAMINATION AT DISCHARGE:  General:          Alert, cooperative, no distress, appears stated age. HEENT:           Atraumatic, anicteric sclerae, pink conjunctivae                          No oral ulcers, mucosa moist, throat clear, dentition fair  Neck:               Supple, symmetrical  Lungs:             Clear to auscultation bilaterally. No Wheezing or Rhonchi. No rales. Chest wall:      No tenderness  No Accessory muscle use. Heart:              Regular  rhythm,  No  murmur   No edema  Abdomen:        Soft, non-tender. Not distended. Bowel sounds normal  Extremities:     No cyanosis. No clubbing,                            Skin turgor normal, Capillary refill normal  Skin:                Not pale. Not Jaundiced  No rashes   Psych:             Not anxious or agitated.   Neurologic:      Alert, moves all extremities, answers questions appropriately and responds to commands       CHRONIC MEDICAL DIAGNOSES:  Problem List as of 2/27/2022 Date Reviewed: 2/26/2022          Codes Class Noted - Resolved    * (Principal) Acute CVA (cerebrovascular accident) [facility]) ICD-10-CM: I63.9  ICD-9-CM: 434.91  2/26/2022 - Present              Greater than 30  minutes were spent with the patient on counseling and coordination of care    Signed:   Beverly Rahman MD  2/27/2022  1:41 PM

## 2022-02-27 NOTE — PROGRESS NOTES
SLP Contact Note    Update: MRI negative. Will sign off. Consult received and appreciated. Patient chart reviewed. CT negative for acute infarct. Pt passed swallow screen. Will await MRI and can complete evaluation if indicated.     Thank you,  TAMMI Dinh.Ed, [id number] [facility]  Speech-Language Pathologist

## 2022-02-27 NOTE — PROGRESS NOTES
[address] February 27, 2022       RE: Georgia      To Whom It May Concern,    This is to certify that Georgia is hospitalized from 2/26 to 2/27. She may return to work on 3/1/2022 if she feels better. Please feel free to contact my office if you have any questions or concerns. Thank you for your assistance in this matter.       Sincerely,  Dotty Holley MD    [phone]

## 2022-02-27 NOTE — ROUTINE PROCESS
TRANSFER - OUT REPORT:    Verbal report given to Ramesh Ojeda RN (name) on [place]  being transferred to NSTU (unit) for routine progression of care       Report consisted of patients Situation, Background, Assessment and   Recommendations(SBAR). Information from the following report(s) SBAR, ED Summary, [facility] and Recent Results was reviewed with the receiving nurse. Lines:   Peripheral IV 02/26/22 Right Antecubital (Active)   Site Assessment Clean, dry, & intact 02/26/22 1221   Phlebitis Assessment 0 02/26/22 1221   Infiltration Assessment 0 02/26/22 1221   Dressing Status Clean, dry, & intact 02/26/22 1221   Dressing Type Tape;Transparent 02/26/22 1221   Hub Color/Line Status Pink;Flushed 02/26/22 1221   Action Taken Blood drawn 02/26/22 1221        Opportunity for questions and clarification was provided. Patient transported with:   Monitor     UPDATE: Patient assessment unchanged.      Visit Vitals  /63 (BP 1 Location: Left arm, BP Patient Position: At rest)   Pulse 71   Temp 98.8 °F (37.1 °C)   Resp 24   Ht 5' 8\" (1.727 m)   Wt 90.4 kg (199 lb 4.7 oz)   SpO2 95%   BMI 30.30 kg/m²

## 2022-02-27 NOTE — PROGRESS NOTES
[address] with . No needs identified. Reason for Admission:  Headache                     RUR Score:     6%                Plan for utilizing home health:     TBD     PCP: First and Last name:  Michele Morse MD     Name of Practice:    Are you a current patient: Yes/No:    Approximate date of last visit:    Can you participate in a virtual visit with your PCP:                     Current Advanced Directive/Advance Care Plan: Full Code      Healthcare Decision Maker:   Click here to complete [address] including selection of the Healthcare Decision Maker Relationship (ie \"Primary\")                             Transition of Care Plan:   Cm met with pt and her  to introduce them to the role of CM and transition of care. This pt lives at home with her  and brother. Prior to admission, this pt was independent with her ADL's and IADL's . She is active and still a . Per pt, her symptoms have resolved. CM will follow for d/c needs. [address] Management Interventions  PCP Verified by CM:  Yes  Palliative Care Criteria Met (RRAT>21 & CHF Dx)?: No  Transition of Care Consult (CM Consult): Discharge Planning  MyChart Signup: No  Discharge Durable Medical Equipment: No  Physical Therapy Consult: Yes  Occupational Therapy Consult: Yes  Speech Therapy Consult: No  Support Systems: Spouse/Significant Other  Confirm Follow Up Transport: Family  The Plan for Transition of Care is Related to the Following Treatment Goals : safe d/c  The Patient and/or Patient Representative was Provided with a Choice of Provider and Agrees with the Discharge Plan?: Yes  Freedom of Choice List was Provided with Basic Dialogue that Supports the Patient's Individualized Plan of Care/Goals, Treatment Preferences and Shares the Quality Data Associated with the Providers?: Yes  [employer] Information Provided?: No

## 2022-02-27 NOTE — DISCHARGE INSTRUCTIONS
Discharge Instructions       PATIENT ID: Magnus Nicolas  MRN: [id number]   YOB: 1970    DATE OF ADMISSION: 2/26/2022 12:11 PM    DATE OF DISCHARGE: 2/27/2022    PRIMARY CARE PROVIDER: Mendel Qureshi MD     ATTENDING PHYSICIAN: Jack Gilford, MD  DISCHARGING PROVIDER: Karen Chacon MD    To contact this individual call [phone] and ask the  to page. If unavailable ask to be transferred the Adult Hospitalist Department. DISCHARGE DIAGNOSES complex migraine     CONSULTATIONS: IP CONSULT TO NEUROLOGY  IP CONSULT TO PSYCHIATRY    PROCEDURES/SURGERIES: * No surgery found *    PENDING TEST RESULTS:   At the time of discharge the following test results are still pending: none     FOLLOW UP APPOINTMENTS:   Follow-up Information     Follow up With Specialties Details Why Contact Info    Mendel Qureshi MD Lab, Internal Medicine   [address] [place] [address]  [phone]             ADDITIONAL CARE RECOMMENDATIONS:  1. Check your blood sugar, should compliant with your insulin and follow your doctor to adjust your insulin   2. Follow your cardiologist as scheduled   3. Follow your psych     DIET: Diabetic Diet      ACTIVITY: Activity as tolerated    *      Radiology      DISCHARGE MEDICATIONS:   See Medication Reconciliation Form    · It is important that you take the medication exactly as they are prescribed. · Keep your medication in the bottles provided by the pharmacist and keep a list of the medication names, dosages, and times to be taken in your wallet. · Do not take other medications without consulting your doctor. NOTIFY YOUR PHYSICIAN FOR ANY OF THE FOLLOWING:   Fever over 101 degrees for 24 hours. Chest pain, shortness of breath, fever, chills, nausea, vomiting, diarrhea, change in mentation, falling, weakness, bleeding. Severe pain or pain not relieved by medications.   Or, any other signs or symptoms that you may have questions about.      DISPOSITION:    Home With:   OT  PT  HH  RN       SNF/Inpatient Rehab/LTAC    Independent/assisted living    Hospice   x Other:     CDMP Checked:   Yes x     PROBLEM LIST Updated:  Yes x       Signed:   Dmitriy Horowitz MD  2/27/2022  1:52 PM

## 2022-02-27 NOTE — PROGRESS NOTES
I have reviewed discharge instructions with the patient and spouse. The patient and spouse verbalized understanding. PIV and telemetry discontinued. Patient discharged to home via family with discharge instructions and belongings.

## 2022-02-27 NOTE — H&P
[facility]  HISTORY AND PHYSICAL    Name:  Cinda Latif  MR#:  [id number]  :  1970  ACCOUNT #:  [de-identified]  ADMIT DATE:  2022      The patient was seen, evaluated, and admitted by me on 2022. PRIMARY CARE PHYSICIAN:  Estrella Ruiz MD    SOURCE OF INFORMATION:  Patient and review of ED electronic medical records. CHIEF COMPLAINT:  Headache. HISTORY OF PRESENT ILLNESS:  This is a 50-year-old woman with a past medical history significant for atrial fibrillation, status post ablation, on Eliquis for anticoagulation, type 2 diabetes, dyslipidemia, obstructive sleep apnea, bipolar disorder, migraine headache, who was in her usual state of health until the day of presentation at the emergency room when the patient developed sudden onset of headache. The patient stated that she was using the restroom at about 10:45 a.m. this morning when she developed the headache. The patient described the headache as a thunderclap type of headache, different from her usual migraine headache. Her usual migraine headache usually responds to Maxalt. The patient took 2 doses of the Maxalt without any significant relief of the headache. The headache was associated with nausea and vomiting and also decreased sensation of right side of her face as well as generalized weakness. The patient was taken to [facility] emergency room at [facility]. When the patient arrived at the emergency room, Code Stroke was called. CT scan of the head without contrast was obtained that was negative for acute pathology. CTA of the head and neck was also performed which did not show any large vessel occlusion. The patient was subsequently referred to the hospitalist service for evaluation for admission. The patient rated the severity of her headache as 10/10. The patient has no prior history of stroke or TIA.   No record of prior admission to this hospital.    PAST MEDICAL HISTORY: Migraine headache, bipolar disorder, obstructive sleep apnea, dyslipidemia, type 2 diabetes, atrial fibrillation, status post ablation. ALLERGIES:  THE PATIENT IS ALLERGIC TO TAPE. MEDICATIONS:  1. Amiodarone 200 mg daily. 2.  Eliquis 5 mg twice daily. 3.  Lipitor, the dosage is not known. 4.  Wellbutrin, the dosage is not known. 5.  Zyrtec 10 mg daily. 6.  Flexeril 10 mg 3 times daily as needed for muscle spasm. 7.  Fenofibrate 120 mg daily. 8.  Neurontin 300 mg daily as needed. 9.  Atarax 50 mg 3 times daily as needed. 10.  Maxalt, dosage as directed. FAMILY HISTORY:  This was reviewed. Her father had heart disease. PAST SURGICAL HISTORY:  This is significant for  section, partial hysterectomy, tonsillectomy, and breast biopsy. SOCIAL HISTORY:  The patient is a former smoker. No history of alcohol abuse. REVIEW OF SYSTEMS:  HEAD, EYES, EARS, NOSE, AND THROAT:  This is positive for headache and photophobia. No blurring of vision. RESPIRATORY:  No cough, no shortness of breath, no hemoptysis. CARDIOVASCULAR:  No chest pain, no orthopnea, no palpitation. GASTROINTESTINAL:  This is positive for nausea and vomiting. No diarrhea. No constipation. GENITOURINARY:  No dysuria, no urgency, and no frequency. All other systems are reviewed and they are negative. PHYSICAL EXAMINATION:  GENERAL APPEARANCE:  The patient appeared ill, in moderate distress. VITAL SIGNS:  On arrival at the emergency room, temperature 98, pulse 67, respiratory rate 20, blood pressure 146/66, and oxygen saturation 100% on room air. HEAD:  Normocephalic, atraumatic. EYES:  Normal eye movement. No redness, no drainage, no discharge. EARS:  Normal external ears with no obvious drainage. NOSE:  No deformity and no drainage. MOUTH AND THROAT:  No visible oral lesion. Dry oral mucosa. NECK:  Neck is supple. No JVD, no thyromegaly. CHEST:  Clear breath sounds. No wheezing, no crackles.   HEART: Normal S1 and S2, regular. No clinically appreciable murmur. ABDOMEN:  Soft, nontender. Normal bowel sounds. CENTRAL NERVOUS SYSTEM:  Alert and oriented x3. No gross focal neurological deficit. EXTREMITIES:  No edema. Pulses 2+ bilaterally. MUSCULOSKELETAL:  No obvious joint deformity or swelling. SKIN:  No active skin lesions seen in the exposed parts of the body. PSYCHIATRIC:  Normal mood and affect. LYMPHATIC:  No cervical lymphadenopathy. DIAGNOSTIC DATA:  EKG shows sinus rhythm and nonspecific ST and T-wave abnormalities. CT scan of the head without contrast, no acute intracranial process. CTA of the head and neck, no large vessel occlusion. LABORATORY DATA:  Hematology, WBC 5.5, hemoglobin 14.8, hematocrit 44.3, platelets 097. Chemistry, sodium 136, potassium 3.7, chloride 95, CO2 of 21, glucose 306, BUN 14, creatinine 1.00, calcium 10.0. Total bilirubin 0.8, ALT 53, AST 21, alkaline phosphatase 48, total protein 8.0, albumin level 4.8, globulin 3.2. Coagulation profile, INR 1.1, PT 10.9. ASSESSMENT:  1. Suspected acute cerebrovascular accident. 2.  Atrial fibrillation, status post ablation. 3.  Type 2 diabetes with hyperglycemia. 4.  Dyslipidemia. 5.  Obstructive sleep apnea. 6.  Migraine headache. 7.  Bipolar disorder. PLAN:  1. Suspected acute CVA. We will admit the patient for further evaluation and treatment. We will obtain MRI of the brain, echocardiogram.  We will check hemoglobin A1c level and lipid profile. Inpatient Neurology consult will be requested to assist in further evaluation and treatment. We will check C-reactive protein level to evaluate the patient for systemic inflammation. We will also check V72 and folic acid level. We will also check urine drug screen. 2.  Atrial fibrillation, status post ablation. The patient underwent cardiac ablation a couple of days ago.   We will continue with amiodarone and Eliquis for anticoagulation and monitor the patient closely. 3.  Type 2 diabetes with hyperglycemia. The patient will be started on sliding scale with insulin coverage. We will check hemoglobin A1c level if one has not been checked recently. 4.  Dyslipidemia. We will continue with Lipitor and check lipid profile. 5.  Obstructive sleep apnea. We will place the patient on CPAP. 6.  Migraine headache. We will continue with preadmission medication. 7.  Bipolar disorder. We will resume preadmission medication. The patient is hoping that she can be seen by a psychiatrist during this hospitalization so that medication for bipolar disorder can be adjusted. Because of that, Psychiatry consult will be requested. 8.  Other issues:  Code status, the patient is a full code. The patient is already on Eliquis for anticoagulation for atrial fibrillation. Because of that, there is no need for DVT prophylaxis. FUNCTIONAL STATUS PRIOR TO ADMISSION:  The patient came from home. The patient is ambulatory with no assistive device. COVID PRECAUTION:  The patient was wearing a face mask. I was wearing a face mask and gloves for this patient's encounter.         Mari Ziegler MD      [id number]  D:  02/26/2022 22:56  T:  02/27/2022 0:04  JOB #:  [id number]  CC:  Kinza Napier MD

## 2022-02-27 NOTE — PROGRESS NOTES
Occupational Therapy    Chart reviewed and orders acknowledged. Patient reports being independent at baseline requiring no DME/AE. Patient admitted for migraine and n/v, severe pain has decreased to dull headache and tingling, al other symptoms have resolved. At this time, patient continues to report being independent in ADL in room, only limited by line management. Has been up to [facility] for convenience. At this time, no acute OT needs identified. Will discontinue OT services at this time, please reconsult if change in functional status.

## 2022-02-27 NOTE — PROGRESS NOTES
Orders received and chart reviewed. Pt reports feeling well today, no issues with balance or gait. Has slight headache but no longer with dizziness. Pt reports she is I at baseline and feels she is back to her BLOF.   Discontinue PT

## 2022-03-18 PROBLEM — I63.9 ACUTE CVA (CEREBROVASCULAR ACCIDENT) (HCC): Status: ACTIVE | Noted: 2022-02-26

## 2023-05-14 RX ORDER — AMIODARONE HYDROCHLORIDE 100 MG/1
200 TABLET ORAL DAILY
COMMUNITY

## 2023-05-14 RX ORDER — GABAPENTIN 300 MG/1
300 CAPSULE ORAL DAILY PRN
COMMUNITY

## 2023-05-14 RX ORDER — CETIRIZINE HYDROCHLORIDE 10 MG/1
CAPSULE, LIQUID FILLED ORAL
COMMUNITY

## 2023-05-14 RX ORDER — FENOFIBRATE 120 MG/1
TABLET ORAL
COMMUNITY

## 2023-05-14 RX ORDER — CYCLOBENZAPRINE HCL 10 MG
TABLET ORAL 3 TIMES DAILY PRN
COMMUNITY

## 2023-05-14 RX ORDER — INSULIN GLARGINE 100 [IU]/ML
35 INJECTION, SOLUTION SUBCUTANEOUS DAILY
COMMUNITY

## 2023-05-14 RX ORDER — HYDROXYZINE 50 MG/1
50 TABLET, FILM COATED ORAL 3 TIMES DAILY PRN
COMMUNITY

## 2023-05-14 RX ORDER — INSULIN LISPRO 100 [IU]/ML
INJECTION, SOLUTION INTRAVENOUS; SUBCUTANEOUS
COMMUNITY